# Patient Record
Sex: MALE | Race: WHITE | NOT HISPANIC OR LATINO | URBAN - METROPOLITAN AREA
[De-identification: names, ages, dates, MRNs, and addresses within clinical notes are randomized per-mention and may not be internally consistent; named-entity substitution may affect disease eponyms.]

---

## 2018-08-02 NOTE — PATIENT DISCUSSION
(H10.45) Other Chronic Allergic Conjunctivitis - Assesment : Examination revealed Allergic Conjunctivitis OU. 8-4+ follicles OU. No medical intervention necessary. - Plan : Glasses prescription given to help with visual acuity and function. Patient use glasses just fro reading. Monitor for changes. Advised patient to call our office with decreased vision or an increase in symptoms.   RTC 1 year/EXAM

## 2019-11-07 NOTE — PATIENT DISCUSSION
New Prescription: prednisolone acetate (prednisolone acetate): drops,suspension: 1% 1 drop four times a day as directed 11-

## 2019-11-07 NOTE — PATIENT DISCUSSION
POAG OU - FOLLOWED BY ALBERTO EVANS, HE HAD A VISUAL FIELD TEST PERFORMED LAST WEEK. REQUEST THESE RECORDS FROM HIS OFFICE PRIOR TO PHACO TO CONSIDER ISTENT. CONTINUE LATANOPROST 1 GTT QHS OU AS DIRECTED BY DR. Noemy Gerard.

## 2019-11-11 NOTE — PATIENT DISCUSSION
Continue: prednisolone acetate (prednisolone acetate): drops,suspension: 1% 1 drop four times a day as directed 11-

## 2019-12-05 NOTE — PATIENT DISCUSSION
Surgery  Counseling: I have discussed the option of glasses versus cataract surgery versus following . It was explained that when vision no longer meets the patient's visual needs and a new prescription for glasses is not likely to improve all of the patient's visual symptoms, the option of cataract surgery is a reasonable next step. It was explained that there is no guarantee that removing the cataract will improve their visual symptoms, however; it is believed that the cataract is contributing to the patient's visual impairment and surgery may significantly improve both the visual and functional status of the patient. The risks, benefits and alternatives of surgery were discussed with the patient. After this discussion, the patient desires to proceed with cataract surgery with implantation of an intraocular lens to improve vision for glare at night.

## 2021-07-16 ENCOUNTER — PREPPED CHART (OUTPATIENT)
Dept: URBAN - METROPOLITAN AREA CLINIC 53 | Facility: CLINIC | Age: 56
End: 2021-07-16

## 2021-08-27 ENCOUNTER — NEW PATIENT ROUTINE/VISION (OUTPATIENT)
Dept: URBAN - METROPOLITAN AREA CLINIC 53 | Facility: CLINIC | Age: 56
End: 2021-08-27

## 2021-08-27 DIAGNOSIS — H52.4: ICD-10-CM

## 2021-08-27 DIAGNOSIS — E11.9: ICD-10-CM

## 2021-08-27 DIAGNOSIS — Z01.00: ICD-10-CM

## 2021-08-27 PROCEDURE — 92004 COMPRE OPH EXAM NEW PT 1/>: CPT

## 2021-08-27 PROCEDURE — 92015 DETERMINE REFRACTIVE STATE: CPT

## 2021-08-27 ASSESSMENT — VISUAL ACUITY
OU_SC: 20/60
OD_SC: 20/50
OS_SC: 20/70
OD_CC: 20/20
OU_CC: 20/20
OS_CC: 20/25-2
OU_CC: J1+

## 2021-08-27 ASSESSMENT — KERATOMETRY
OD_AXISANGLE_DEGREES: 147
OD_K1POWER_DIOPTERS: 43.00
OS_K2POWER_DIOPTERS: 43.25
OS_AXISANGLE_DEGREES: 012
OS_AXISANGLE2_DEGREES: 102
OD_K2POWER_DIOPTERS: 42.75
OD_AXISANGLE2_DEGREES: 57
OS_K1POWER_DIOPTERS: 44.00

## 2021-08-27 ASSESSMENT — TONOMETRY
OD_IOP_MMHG: 18
OS_IOP_MMHG: 18

## 2022-08-31 ENCOUNTER — ESTABLISHED PATIENT (OUTPATIENT)
Dept: URBAN - METROPOLITAN AREA CLINIC 53 | Facility: CLINIC | Age: 57
End: 2022-08-31

## 2022-08-31 DIAGNOSIS — E11.9: ICD-10-CM

## 2022-08-31 DIAGNOSIS — Z01.00: ICD-10-CM

## 2022-08-31 DIAGNOSIS — H52.4: ICD-10-CM

## 2022-08-31 DIAGNOSIS — H25.13: ICD-10-CM

## 2022-08-31 PROCEDURE — 92014 COMPRE OPH EXAM EST PT 1/>: CPT

## 2022-08-31 PROCEDURE — 92015 DETERMINE REFRACTIVE STATE: CPT

## 2022-08-31 ASSESSMENT — KERATOMETRY
OS_AXISANGLE_DEGREES: 94
OS_K2POWER_DIOPTERS: 44.00
OD_AXISANGLE2_DEGREES: 170
OD_K1POWER_DIOPTERS: 42.50
OS_AXISANGLE2_DEGREES: 4
OD_AXISANGLE_DEGREES: 80
OS_K1POWER_DIOPTERS: 42.75
OD_K2POWER_DIOPTERS: 43.25

## 2022-08-31 ASSESSMENT — VISUAL ACUITY
OD_CC: J1
OU_CC: J1+
OS_PH: 20/20
OD_PH: 20/20
OD_CC: 20/30
OS_CC: J1
OS_CC: 20/30

## 2022-08-31 ASSESSMENT — TONOMETRY
OD_IOP_MMHG: 18
OS_IOP_MMHG: 18

## 2023-01-16 ENCOUNTER — CONTACT LENSES/GLASSES VISIT (OUTPATIENT)
Dept: URBAN - METROPOLITAN AREA CLINIC 53 | Facility: CLINIC | Age: 58
End: 2023-01-16

## 2023-01-16 DIAGNOSIS — H52.4: ICD-10-CM

## 2023-01-16 PROCEDURE — 92015GRNC REFRACTION GLASSES RECHECK NO CHARGE

## 2023-01-16 ASSESSMENT — KERATOMETRY
OS_K2POWER_DIOPTERS: 44.00
OS_AXISANGLE_DEGREES: 94
OS_AXISANGLE2_DEGREES: 4
OD_K2POWER_DIOPTERS: 43.25
OD_AXISANGLE_DEGREES: 80
OS_K1POWER_DIOPTERS: 42.75
OD_AXISANGLE2_DEGREES: 170
OD_K1POWER_DIOPTERS: 42.50

## 2023-01-16 ASSESSMENT — VISUAL ACUITY
OS_CC: 20/30
OD_CC: 20/40
OU_CC: J2@16"

## 2023-01-16 NOTE — PATIENT DISCUSSION
Axis OU changed significantly during todays refraction OU.  Patients VA improved greatly with axis change OU MRx.  Advised patient to reach out out us again if new Rx still does not work for him.

## 2023-10-31 ENCOUNTER — COMPREHENSIVE EXAM (OUTPATIENT)
Dept: URBAN - METROPOLITAN AREA CLINIC 53 | Facility: CLINIC | Age: 58
End: 2023-10-31

## 2023-10-31 DIAGNOSIS — Z01.01: ICD-10-CM

## 2023-10-31 DIAGNOSIS — H52.4: ICD-10-CM

## 2023-10-31 DIAGNOSIS — H25.13: ICD-10-CM

## 2023-10-31 DIAGNOSIS — E11.9: ICD-10-CM

## 2023-10-31 PROCEDURE — 92015 DETERMINE REFRACTIVE STATE: CPT

## 2023-10-31 PROCEDURE — 92134 CPTRZ OPH DX IMG PST SGM RTA: CPT | Mod: NC

## 2023-10-31 PROCEDURE — 92014 COMPRE OPH EXAM EST PT 1/>: CPT

## 2023-10-31 ASSESSMENT — VISUAL ACUITY
OD_CC: 20/50
OS_GLARE: 20/25
OD_PH: 20/25
OS_PH: 20/25
OS_CC: 20/30
OD_GLARE: 20/30
OU_CC: J2@16"
OD_GLARE: 20/30
OS_GLARE: 20/25

## 2023-10-31 ASSESSMENT — TONOMETRY
OD_IOP_MMHG: 18
OS_IOP_MMHG: 18

## 2023-10-31 ASSESSMENT — KERATOMETRY
OD_AXISANGLE_DEGREES: 68
OD_K2POWER_DIOPTERS: 43.25
OS_K2POWER_DIOPTERS: 44.25
OD_AXISANGLE2_DEGREES: 158
OS_K1POWER_DIOPTERS: 43.25
OD_K1POWER_DIOPTERS: 42.75
OS_AXISANGLE2_DEGREES: 4
OS_AXISANGLE_DEGREES: 94

## 2023-11-28 ENCOUNTER — CONTACT LENSES/GLASSES VISIT (OUTPATIENT)
Dept: URBAN - METROPOLITAN AREA CLINIC 53 | Facility: CLINIC | Age: 58
End: 2023-11-28

## 2023-11-28 DIAGNOSIS — H04.123: ICD-10-CM

## 2023-11-28 DIAGNOSIS — H52.4: ICD-10-CM

## 2023-11-28 PROCEDURE — 92015GRNC REFRACTION GLASSES RECHECK NO CHARGE

## 2023-11-28 ASSESSMENT — VISUAL ACUITY
OS_CC: 20/40
OD_CC: 20/50
OS_PH: 20/25 PUSH
OD_PH: 20/30 PUSH

## 2023-11-28 ASSESSMENT — KERATOMETRY
OD_AXISANGLE_DEGREES: 68
OS_AXISANGLE_DEGREES: 94
OS_K1POWER_DIOPTERS: 43.25
OD_K1POWER_DIOPTERS: 42.75
OS_AXISANGLE2_DEGREES: 4
OD_AXISANGLE2_DEGREES: 158
OS_K2POWER_DIOPTERS: 44.25
OD_K2POWER_DIOPTERS: 43.25

## 2024-12-03 ENCOUNTER — COMPREHENSIVE EXAM (OUTPATIENT)
Age: 59
End: 2024-12-03

## 2024-12-03 DIAGNOSIS — H04.123: ICD-10-CM

## 2024-12-03 DIAGNOSIS — H52.4: ICD-10-CM

## 2024-12-03 DIAGNOSIS — E11.9: ICD-10-CM

## 2024-12-03 DIAGNOSIS — H25.13: ICD-10-CM

## 2024-12-03 DIAGNOSIS — Z01.01: ICD-10-CM

## 2024-12-03 PROCEDURE — 92015 DETERMINE REFRACTIVE STATE: CPT

## 2024-12-03 PROCEDURE — 92014 COMPRE OPH EXAM EST PT 1/>: CPT
